# Patient Record
Sex: MALE | Race: BLACK OR AFRICAN AMERICAN | NOT HISPANIC OR LATINO
[De-identification: names, ages, dates, MRNs, and addresses within clinical notes are randomized per-mention and may not be internally consistent; named-entity substitution may affect disease eponyms.]

---

## 2018-09-17 ENCOUNTER — APPOINTMENT (OUTPATIENT)
Dept: CARDIOLOGY | Facility: CLINIC | Age: 43
End: 2018-09-17
Payer: COMMERCIAL

## 2018-09-17 VITALS
DIASTOLIC BLOOD PRESSURE: 80 MMHG | HEIGHT: 68 IN | BODY MASS INDEX: 24.25 KG/M2 | WEIGHT: 160 LBS | RESPIRATION RATE: 16 BRPM | HEART RATE: 82 BPM | SYSTOLIC BLOOD PRESSURE: 130 MMHG

## 2018-09-17 DIAGNOSIS — Z78.9 OTHER SPECIFIED HEALTH STATUS: ICD-10-CM

## 2018-09-17 PROCEDURE — 93000 ELECTROCARDIOGRAM COMPLETE: CPT | Mod: 59

## 2018-09-17 PROCEDURE — 99244 OFF/OP CNSLTJ NEW/EST MOD 40: CPT

## 2018-09-17 PROCEDURE — 93224 XTRNL ECG REC UP TO 48 HRS: CPT

## 2018-09-27 ENCOUNTER — APPOINTMENT (OUTPATIENT)
Dept: CARDIOLOGY | Facility: CLINIC | Age: 43
End: 2018-09-27
Payer: COMMERCIAL

## 2018-09-27 PROCEDURE — 93015 CV STRESS TEST SUPVJ I&R: CPT

## 2018-11-07 ENCOUNTER — APPOINTMENT (OUTPATIENT)
Dept: CARDIOLOGY | Facility: CLINIC | Age: 43
End: 2018-11-07
Payer: COMMERCIAL

## 2018-11-07 VITALS
HEIGHT: 68 IN | BODY MASS INDEX: 24.25 KG/M2 | SYSTOLIC BLOOD PRESSURE: 118 MMHG | DIASTOLIC BLOOD PRESSURE: 85 MMHG | RESPIRATION RATE: 18 BRPM | WEIGHT: 160 LBS

## 2018-11-07 PROCEDURE — 99214 OFFICE O/P EST MOD 30 MIN: CPT

## 2018-11-08 NOTE — PHYSICAL EXAM
[FreeTextEntry1] :                    Well appearing and nourished with no obvious deformities or distress.Palpation of the chest wall shows some focal tenderness but does not clearly reproduce his symptoms.\par \par Eyes: \par No conjunctival injection and no xanthelasmas.\par HEENT: \par Normocephalic.Normal oral mucosa. No pallor or cyanosis\par Neck: \par No jugular venous distension. with normal A and V wave forms. No palpable adenopathy.\par Cardiovascular: \par Normal rate and rhythm with normal S1, S2 and a grade 1/6 systolic murmur. Distal arterial pulses are normal. No significant peripheral edema.\par Pulmonary: \par Lungs are clear to auscultation and percussion. Normal respiratory pattern without any accessory muscle use\par Abdomen: \par Soft, non-tender ; no palpable organomegaly or masses.\par Extremities:\par No digital clubbing, cyanosis or ischemic changes.\par Skin: \par No skin lesions, rashes, ulcers or xanthomas.\par Psychiatric: \par Alert and oriented to person, place and time. Appropriate mood and affect.

## 2018-11-08 NOTE — REASON FOR VISIT
[FreeTextEntry1] : This is a 49-year-old male who presents for cardiac re-evaluation.  \par He has two sets of complaints:\par 1.  Chest pain that is multifocal in its location.  Some of it is mid sternal, some is in the left upper and outer quadrant of his pectoralis muscle, and some of it is left interpectoral.  The locations are focal, they are not radiating.  They usually are seconds to minutes.  Symptoms not associated with any significant activity, although certain positions such as bending to pick things up can exacerbate it. \par It was felt to be very atypical and likely musculoskeletal, but because of the pt's great concerns he was referred for EST.\par \par 2.  The patient complains of palpitations but these are usual nocturnal and described as a quick fluttering sensation.  Symptoms can occur for variable lengths of time, but are not necessarily occurring every night.  There have been no associated symptoms of dizziness, syncope, or near-syncope.  He has not had any of these symptoms in the past having only just begun in the last few weeks. \par A 24 hour Holter monitor was used to evaluate this further.\par \par No preexisting history of any cardiovascular disease.  No substance abuse.  No alcohol or tobacco use. \par \par No history of hyperlipidemia, diabetes, hypertension, or family history of premature sudden cardiac death.  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  He does not exercise regularly at this point in time.  No PND, orthopnea, syncope, or near-syncope.  \par \par \par

## 2018-11-08 NOTE — ASSESSMENT
[FreeTextEntry1] : An outside echocardiogram reportedly performed is not available to review and will be requested.\par \par Exercise stress test 9/27/18.\par Patient exercised 12 minutes to a peak heart rate of 190 at 107% maximum).\par Blood pressure response elevated to 200/86.\par Some occasional unifocal PVCs noted during exercise.\par No ischemic changes were noted.\par \par 24-hour Holter monitor 9/17/18.\par All sinus rhythm. Average heart rate 75. Range . No arrhythmias were noted.\par \par The patient presents with two sets of symptoms.  The initial chest pain symptoms seem to be somewhat atypical in nature.  He does not have any known risk factors or coronary artery disease either.  The stress test is low probability and does not show any evidence of coronary insufficiency.\par \par The patient’s symptoms of palpitations are a bit less clear.He thinks that the PVCs that he experienced during the stress test are consistent with some of the symptoms of palpitations.\par \par I recommended that he try taking magnesium 250 mg p.o. daily.\par Suggested that he discontinue all caffeine.\par Do not think there is a reason for him to discontinue exercise \par We do need to review the echocardiogram. If he has a structurally normal heart, then these benign unifocal PVCs are not likely to require further management.

## 2018-11-20 RX ORDER — FOLIC ACID 1 MG/1
1 TABLET ORAL
Refills: 0 | Status: ACTIVE | COMMUNITY

## 2019-01-07 ENCOUNTER — APPOINTMENT (OUTPATIENT)
Dept: CARDIOLOGY | Facility: CLINIC | Age: 44
End: 2019-01-07
Payer: COMMERCIAL

## 2019-01-07 VITALS
HEIGHT: 68 IN | HEART RATE: 79 BPM | DIASTOLIC BLOOD PRESSURE: 90 MMHG | SYSTOLIC BLOOD PRESSURE: 160 MMHG | WEIGHT: 158 LBS | BODY MASS INDEX: 23.95 KG/M2 | RESPIRATION RATE: 16 BRPM

## 2019-01-07 PROCEDURE — 93000 ELECTROCARDIOGRAM COMPLETE: CPT

## 2019-01-07 PROCEDURE — 99214 OFFICE O/P EST MOD 30 MIN: CPT

## 2019-01-07 NOTE — ASSESSMENT
[FreeTextEntry1] : ECG: Normal sinus rhythm at 79 beats a minute. Otherwise within normal limits.\par \par An outside echocardiogram 9/9/18 was WNL.\par \par Exercise stress test 9/27/18.\par Patient exercised 12 minutes to a peak heart rate of 190 at 107% maximum).\par Blood pressure response elevated to 200/86.\par Some occasional unifocal PVCs noted during exercise.\par No ischemic changes were noted.\par \par 24-hour Holter monitor 9/17/18.\par All sinus rhythm. Average heart rate 75. Range . No arrhythmias were noted.\par \par The patient presents with two sets of symptoms.  The initial chest pain symptoms seem to be somewhat atypical in nature.  He does not have any known risk factors or coronary artery disease either.  The stress test is low probability and does not show any evidence of coronary insufficiency.\par \par The patient’s symptoms of palpitations are a bit less clear.He thinks that the PVCs that he experienced during the stress test are consistent with some of the symptoms of palpitations.\par taking magnesium 250 mg p.o. daily. has helped substantially\par Suggested that he discontinue all caffeine.\par Do not think there is a reason for him to discontinue exercise \par \par Nocturnal awakenings may represent obstructive sleep apnea. Will order a home sleep study to further evaluate this.\par \par While he is significantly better on magnesium supplementation, he is still greatly disturbed by the palpitations when they do occur which is most often at night. Therefore am recommending that we begin Toprol-XL 25 mg p.o. q.h.s. for symptom relief.

## 2019-01-07 NOTE — REASON FOR VISIT
[FreeTextEntry1] : This is a 43-year-old male who presents for cardiac re-evaluation.  \par He has two sets of complaints:\par 1.  Chest pain that is multifocal in its location.  Some of it is mid sternal, some is in the left upper and outer quadrant of his pectoralis muscle, and some of it is left interpectoral.  The locations are focal, they are not radiating.  They usually are seconds to minutes.  Symptoms not associated with any significant activity, although certain positions such as bending to pick things up can exacerbate it. \par It was felt to be very atypical and likely musculoskeletal, but because of the pt's great concerns he was referred for EST which did not show any evidence of ischemia.\par \par 2.  The patient complains of palpitations but these are usual nocturnal and described as a quick fluttering sensation.  Symptoms can occur for variable lengths of time, but are not necessarily occurring every night.  There have been no associated symptoms of dizziness, syncope, or near-syncope.  He has not had any of these symptoms in the past having only just begun in the last few weeks. \par A 24 hour Holter monitor was used to evaluate this further and revealed no arrhythmia. PVC's were seen on the stress test.\par Magnesium has resulted in considerable symptomatic improvement.\par \par No preexisting history of any cardiovascular disease.  No substance abuse.  No alcohol or tobacco use. \par \par No history of hyperlipidemia, diabetes, hypertension, or family history of premature sudden cardiac death.  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  He does not exercise regularly at this point in time.  No PND, orthopnea, syncope, or near-syncope.  \par \par \par Also now relates that he is getting episodes of sudden nocturnal awakenings as though he is not breathing. This seems to correlate times with his palpitations.

## 2019-04-15 ENCOUNTER — NON-APPOINTMENT (OUTPATIENT)
Age: 44
End: 2019-04-15

## 2019-04-15 ENCOUNTER — APPOINTMENT (OUTPATIENT)
Dept: CARDIOLOGY | Facility: CLINIC | Age: 44
End: 2019-04-15
Payer: COMMERCIAL

## 2019-04-15 VITALS
SYSTOLIC BLOOD PRESSURE: 155 MMHG | WEIGHT: 156 LBS | BODY MASS INDEX: 23.64 KG/M2 | RESPIRATION RATE: 16 BRPM | DIASTOLIC BLOOD PRESSURE: 85 MMHG | HEART RATE: 69 BPM | HEIGHT: 68 IN

## 2019-04-15 PROCEDURE — 99214 OFFICE O/P EST MOD 30 MIN: CPT

## 2019-04-15 PROCEDURE — 93000 ELECTROCARDIOGRAM COMPLETE: CPT

## 2019-04-15 RX ORDER — METOPROLOL SUCCINATE 25 MG/1
25 TABLET, EXTENDED RELEASE ORAL DAILY
Qty: 90 | Refills: 3 | Status: DISCONTINUED | COMMUNITY
Start: 2019-01-07 | End: 2019-04-15

## 2019-04-15 RX ORDER — B-COMPLEX WITH VITAMIN C
TABLET ORAL
Refills: 0 | Status: DISCONTINUED | COMMUNITY
End: 2019-04-15

## 2019-04-15 RX ORDER — PNV NO.95/FERROUS FUM/FOLIC AC 28MG-0.8MG
TABLET ORAL
Refills: 0 | Status: DISCONTINUED | COMMUNITY
End: 2019-04-15

## 2019-04-15 RX ORDER — MAGNESIUM 200 MG
200 TABLET ORAL
Refills: 0 | Status: ACTIVE | COMMUNITY

## 2019-04-15 RX ORDER — CHOLECALCIFEROL (VITAMIN D3) 25 MCG
TABLET ORAL
Refills: 0 | Status: DISCONTINUED | COMMUNITY
End: 2019-04-15

## 2019-04-15 NOTE — ASSESSMENT
[FreeTextEntry1] : ECG: Normal sinus rhythm at 69 beats a minute. Otherwise within normal limits.\par \par An outside echocardiogram 9/9/18 was WNL.\par \par Exercise stress test 9/27/18.\par Patient exercised 12 minutes to a peak heart rate of 190 at 107% maximum).\par Blood pressure response elevated to 200/86.\par Some occasional unifocal PVCs noted during exercise.\par No ischemic changes were noted.\par \par 24-hour Holter monitor 9/17/18.\par All sinus rhythm. Average heart rate 75. Range . No arrhythmias were noted.\par \par The patient had presented with two sets of symptoms.  The initial chest pain symptoms seem to be somewhat atypical in nature.  He does not have any known risk factors or coronary artery disease either.  The stress test is low probability and does not show any evidence of coronary insufficiency.\par \par The patient’s symptoms of palpitations are a bit less clear.He thinks that the PVCs that he experienced during the stress test are consistent with some of the symptoms of palpitations.\par taking magnesium 200 mg p.o. daily. has helped substantially\par Suggested that he discontinue all caffeine.\par Do not think there is a reason for him to discontinue exercise \par BB Rx was not helpful by his report\par Nocturnal awakenings may represent obstructive sleep apnea. Will ordered a home sleep study to further evaluate this but he declined to have it\par \par While he is significantly better on magnesium supplementation, he is still greatly disturbed by the palpitations when they do occur which is most often at night. Therefore am recommending that he get an event monitor to see if there is any pathology to pursue.

## 2019-04-15 NOTE — REASON FOR VISIT
[FreeTextEntry1] : This is a 43-year-old male who presents for cardiac re-evaluation.  \par \par  The patient primarily complains of palpitations. These are usual nocturnal and described as a quick fluttering sensation.  Symptoms can occur for variable lengths of time, but are not necessarily occurring every night.  There have been no associated symptoms of dizziness, syncope, or near-syncope.  He has not had any of these symptoms in the past having only just begun in the last few weeks. \par A 24 hour Holter monitor was used to evaluate this further and revealed no arrhythmia. PVC's were seen on the stress test.\par \par Magnesium 200 mg has resulted in considerable symptomatic improvement.\par \par We tried metoprolol, however, after a few weeks he started feeling that it was not necessarily helping him it was possibly making him feel weak or tired in the morning. He was also concerned about his heart rate occasionally been in the 40s.\par No preexisting history of any cardiovascular disease.  No substance abuse.  No alcohol or tobacco use. \par \par No history of hyperlipidemia, diabetes, hypertension, or family history of premature sudden cardiac death.  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  He does not exercise regularly at this point in time.  No PND, orthopnea, syncope, or near-syncope.  \par \par \par Also now relates that he is getting episodes of sudden nocturnal awakenings as though he is not breathing. This seems to correlate times with his palpitations.

## 2019-07-22 ENCOUNTER — APPOINTMENT (OUTPATIENT)
Dept: CARDIOLOGY | Facility: CLINIC | Age: 44
End: 2019-07-22

## 2019-07-29 ENCOUNTER — APPOINTMENT (OUTPATIENT)
Dept: CARDIOLOGY | Facility: CLINIC | Age: 44
End: 2019-07-29
Payer: COMMERCIAL

## 2019-07-29 ENCOUNTER — RECORD ABSTRACTING (OUTPATIENT)
Age: 44
End: 2019-07-29

## 2019-07-29 ENCOUNTER — NON-APPOINTMENT (OUTPATIENT)
Age: 44
End: 2019-07-29

## 2019-07-29 VITALS
OXYGEN SATURATION: 98 % | DIASTOLIC BLOOD PRESSURE: 82 MMHG | SYSTOLIC BLOOD PRESSURE: 142 MMHG | BODY MASS INDEX: 22.73 KG/M2 | HEIGHT: 68 IN | WEIGHT: 150 LBS | RESPIRATION RATE: 16 BRPM | HEART RATE: 76 BPM

## 2019-07-29 DIAGNOSIS — I49.3 VENTRICULAR PREMATURE DEPOLARIZATION: ICD-10-CM

## 2019-07-29 DIAGNOSIS — R00.2 PALPITATIONS: ICD-10-CM

## 2019-07-29 DIAGNOSIS — Z00.00 ENCOUNTER FOR GENERAL ADULT MEDICAL EXAMINATION W/OUT ABNORMAL FINDINGS: ICD-10-CM

## 2019-07-29 DIAGNOSIS — R07.9 CHEST PAIN, UNSPECIFIED: ICD-10-CM

## 2019-07-29 PROCEDURE — 99214 OFFICE O/P EST MOD 30 MIN: CPT

## 2019-07-29 PROCEDURE — 93000 ELECTROCARDIOGRAM COMPLETE: CPT

## 2019-07-29 RX ORDER — CETIRIZINE HCL 10 MG
10 TABLET ORAL DAILY
Refills: 0 | Status: ACTIVE | COMMUNITY

## 2019-07-29 NOTE — REASON FOR VISIT
[FreeTextEntry1] : This is a 44-year-old male who presents for cardiac re-evaluation.  \par \par The patient continues to have  complaints of palpitations. These are usual nocturnal and described as a quick fluttering sensation.  Symptoms can occur for variable lengths of time, but are not necessarily occurring every night.  There have been no associated symptoms of dizziness, syncope, or near-syncope. \par \par We tried metoprolol, however, after a few weeks he started feeling that it was not necessarily helping him it was possibly making him feel weak or tired in the morning. He was also concerned about his heart rate occasionally been in the 40s.\par \par No preexisting history of any cardiovascular disease.  No substance abuse.  No alcohol or tobacco use. \par \par No history of hyperlipidemia, diabetes, hypertension, or family history of premature sudden cardiac death.  \par \par He does not exercise regularly at this point in time and states concerns of aggravating symptoms while physical exerting himself..  No PND, orthopnea, syncope, or near-syncope.  \par \par During our last office visit we discussed an episode  of sudden nocturnal awakenings as though he is not breathing. There has been no reoccurrence of this since.\par \par  He has since completed an event monitor study 5/4/19 which showed occ. PVCs otherwise sinus rhythm.\par \par In addition to Magnesium 200 mg daily he has started Magnesium 125 mg BID, there has been some minor improvement.\par \par He denies and SOB, edema, chest pain with rest or exertion.\par \par Rare caffeine and no ETOH

## 2019-07-29 NOTE — ASSESSMENT
[FreeTextEntry1] : ECG: Normal sinus rhythm at 76 beats a minute. Otherwise within normal limits.\par \par An outside echocardiogram 9/9/18 was WNL.\par \par Exercise stress test 9/27/18.\par Patient exercised 12 minutes to a peak heart rate of 190 at 107% maximum).\par Blood pressure response elevated to 200/86.\par Some occasional unifocal PVCs noted during exercise.\par No ischemic changes were noted.\par \par Event monitor 5/4/19\par Occ. pvcs\par Sinus rhythm\par \par 24-hour Holter monitor 9/17/18.\par All sinus rhythm. Average heart rate 75. Range . No arrhythmias were noted.\par \par  \par -The patient’s symptoms of palpitations have continued and concern him. He thinks that the PVCs that he experienced during the stress test are consistent with some of the symptoms of palpitations. There continues to be  no SOB, near syncope/syncope or  chest pain during these episodes.\par \par -Taking magnesium 200 mg p.o. daily and magnesium 125 mg BID. has helped substantially\par -Denies any caffeine, drug or ETOH use.\par -Hesitant to restart exercising.\par -Previous BB Rx was not helpful by his report\par -Nocturnal awakenings may represent obstructive sleep apnea. Will ordered a home sleep study to further evaluate this but he declined to have it\par \par While he is significantly better on magnesium supplementation, he continues to be  greatly disturbed by the palpitations. \par \par Mr. Gonzales again has been reassured that his symptoms related PVCs will not result in a life threatening condition and is more of an annoyance. He has been encouraged to resume a weekly exercise regimen but needs to initiate it with a 15-20 minute warm up period and a 15-20 min cool down period.\par \par At this time he is does not wish to start any new medications and will attempt to handle his symptoms with his current medication regimen.\par \par Clinical follow up in 6 months

## 2019-10-09 ENCOUNTER — EMERGENCY (EMERGENCY)
Facility: HOSPITAL | Age: 44
LOS: 1 days | Discharge: HOME | End: 2019-10-09
Attending: STUDENT IN AN ORGANIZED HEALTH CARE EDUCATION/TRAINING PROGRAM
Payer: COMMERCIAL

## 2019-10-09 VITALS
WEIGHT: 167.99 LBS | HEIGHT: 68 IN | HEART RATE: 93 BPM | RESPIRATION RATE: 18 BRPM | DIASTOLIC BLOOD PRESSURE: 95 MMHG | OXYGEN SATURATION: 100 % | SYSTOLIC BLOOD PRESSURE: 161 MMHG | TEMPERATURE: 98 F

## 2019-10-09 PROCEDURE — 99284 EMERGENCY DEPT VISIT MOD MDM: CPT

## 2019-10-10 VITALS
TEMPERATURE: 98 F | RESPIRATION RATE: 18 BRPM | HEART RATE: 74 BPM | OXYGEN SATURATION: 100 % | DIASTOLIC BLOOD PRESSURE: 93 MMHG | SYSTOLIC BLOOD PRESSURE: 146 MMHG

## 2019-10-10 LAB
ALBUMIN SERPL ELPH-MCNC: 4.4 G/DL — SIGNIFICANT CHANGE UP (ref 3.3–5.2)
ALP SERPL-CCNC: 55 U/L — SIGNIFICANT CHANGE UP (ref 40–120)
ALT FLD-CCNC: 13 U/L — SIGNIFICANT CHANGE UP
ANION GAP SERPL CALC-SCNC: 11 MMOL/L — SIGNIFICANT CHANGE UP (ref 5–17)
AST SERPL-CCNC: 20 U/L — SIGNIFICANT CHANGE UP
BILIRUB SERPL-MCNC: 0.2 MG/DL — LOW (ref 0.4–2)
BUN SERPL-MCNC: 16 MG/DL — SIGNIFICANT CHANGE UP (ref 8–20)
CALCIUM SERPL-MCNC: 9.6 MG/DL — SIGNIFICANT CHANGE UP (ref 8.6–10.2)
CHLORIDE SERPL-SCNC: 101 MMOL/L — SIGNIFICANT CHANGE UP (ref 98–107)
CO2 SERPL-SCNC: 26 MMOL/L — SIGNIFICANT CHANGE UP (ref 22–29)
CREAT SERPL-MCNC: 1.5 MG/DL — HIGH (ref 0.5–1.3)
GLUCOSE SERPL-MCNC: 93 MG/DL — SIGNIFICANT CHANGE UP (ref 70–115)
HCT VFR BLD CALC: 41.1 % — SIGNIFICANT CHANGE UP (ref 39–50)
HGB BLD-MCNC: 13 G/DL — SIGNIFICANT CHANGE UP (ref 13–17)
MAGNESIUM SERPL-MCNC: 2 MG/DL — SIGNIFICANT CHANGE UP (ref 1.6–2.6)
MCHC RBC-ENTMCNC: 22.6 PG — LOW (ref 27–34)
MCHC RBC-ENTMCNC: 31.6 GM/DL — LOW (ref 32–36)
MCV RBC AUTO: 71.6 FL — LOW (ref 80–100)
PLATELET # BLD AUTO: 217 K/UL — SIGNIFICANT CHANGE UP (ref 150–400)
POTASSIUM SERPL-MCNC: 4.5 MMOL/L — SIGNIFICANT CHANGE UP (ref 3.5–5.3)
POTASSIUM SERPL-SCNC: 4.5 MMOL/L — SIGNIFICANT CHANGE UP (ref 3.5–5.3)
PROT SERPL-MCNC: 7.5 G/DL — SIGNIFICANT CHANGE UP (ref 6.6–8.7)
RBC # BLD: 5.74 M/UL — SIGNIFICANT CHANGE UP (ref 4.2–5.8)
RBC # FLD: 14.3 % — SIGNIFICANT CHANGE UP (ref 10.3–14.5)
SODIUM SERPL-SCNC: 138 MMOL/L — SIGNIFICANT CHANGE UP (ref 135–145)
T4 AB SER-ACNC: 5.2 UG/DL — SIGNIFICANT CHANGE UP (ref 4.5–12)
TROPONIN T SERPL-MCNC: <0.01 NG/ML — SIGNIFICANT CHANGE UP (ref 0–0.06)
TSH SERPL-MCNC: 1.23 UIU/ML — SIGNIFICANT CHANGE UP (ref 0.27–4.2)
WBC # BLD: 4.22 K/UL — SIGNIFICANT CHANGE UP (ref 3.8–10.5)
WBC # FLD AUTO: 4.22 K/UL — SIGNIFICANT CHANGE UP (ref 3.8–10.5)

## 2019-10-10 PROCEDURE — 80053 COMPREHEN METABOLIC PANEL: CPT

## 2019-10-10 PROCEDURE — 93005 ELECTROCARDIOGRAM TRACING: CPT

## 2019-10-10 PROCEDURE — 70450 CT HEAD/BRAIN W/O DYE: CPT

## 2019-10-10 PROCEDURE — 84484 ASSAY OF TROPONIN QUANT: CPT

## 2019-10-10 PROCEDURE — 93010 ELECTROCARDIOGRAM REPORT: CPT

## 2019-10-10 PROCEDURE — 36415 COLL VENOUS BLD VENIPUNCTURE: CPT

## 2019-10-10 PROCEDURE — 83735 ASSAY OF MAGNESIUM: CPT

## 2019-10-10 PROCEDURE — 70450 CT HEAD/BRAIN W/O DYE: CPT | Mod: 26

## 2019-10-10 PROCEDURE — 84443 ASSAY THYROID STIM HORMONE: CPT

## 2019-10-10 PROCEDURE — 99284 EMERGENCY DEPT VISIT MOD MDM: CPT

## 2019-10-10 PROCEDURE — 84436 ASSAY OF TOTAL THYROXINE: CPT

## 2019-10-10 PROCEDURE — 85027 COMPLETE CBC AUTOMATED: CPT

## 2019-10-10 NOTE — ED ADULT NURSE NOTE - OBJECTIVE STATEMENT
Pt Pt c/o of shaking when sleeping at night witnessed by spouse. Pt spouse states he shakes when he sleeps but it usually subsides after a minute. Lasst night pt was awake and began to shake which woke him up and he continued to shake even after being up. Pt spouse drove pt to ED. Pt has PMHx of PVC's, HTN. Pt on assessment has no shaking going on and no motor deficits.

## 2019-10-10 NOTE — ED PROVIDER NOTE - CHPI ED SYMPTOMS NEG
no chest pain/no cough/no back pain/no dizziness/no fever/no diaphoresis/no chills/no nausea/no syncope/no shortness of breath/no vomiting

## 2019-10-10 NOTE — ED PROVIDER NOTE - ATTENDING CONTRIBUTION TO CARE
43yo male with pmh of PVC (no meds) presents with sudden onset of palpitations, tremors earlier this evening, SO states woke up to him shaking - no seizure like activity. Pt woke up no post ictal period, took /100 and became concerned came to ED. Pt denies fevers/chills, ha, loc, focal neuro deficits, cp/sob, cough, abd pain/n/v/d, urinary symptoms, recent travel and sick contacts.  Const: Awake, alert and oriented. In no acute distress. Well appearing.  HEENT: NC/AT. Moist mucous membranes.  Eyes: No scleral icterus. EOMI.  Neck:. Soft and supple. Full ROM without pain.  Cardiac: Regular rate and regular rhythm. +S1/S2. Peripheral pulses 2+ and symmetric. No LE edema.  Resp: Speaking in full sentences. No evidence of respiratory distress. No wheezes, rales or rhonchi.  Abd: Soft, non-tender, non-distended. Normal bowel sounds in all 4 quadrants. No guarding or rebound.  Back: Spine midline and non-tender. No CVAT.  Skin: No rashes, abrasions or lacerations.  Lymph: No cervical lymphadenopathy.  Neuro: Awake, alert & oriented x 3. Moves all extremities symmetrically. follows commands, motor sumaya upper and lower ext 5/5, sensory symm and intact CN 2-12 grossly intact, no ataxia, no nystagmus, no dysmetria, no ddk, symm sumaya, no pronator drift  r/o end organ damage - labs, ekg, trop I personally saw the patient with the PA, and completed the key components of the history and physical exam. I then discussed the management plan with the PA.    45yo male with pmh of PVC (no meds) presents with sudden onset of palpitations, tremors earlier this evening, SO states woke up to him shaking - no seizure like activity. Pt woke up no post ictal period, took /100 and became concerned came to ED. Pt denies fevers/chills, ha, loc, focal neuro deficits, cp/sob, cough, abd pain/n/v/d, urinary symptoms, recent travel and sick contacts.  Const: Awake, alert and oriented. In no acute distress. Well appearing.  HEENT: NC/AT. Moist mucous membranes.  Eyes: No scleral icterus. EOMI.  Neck:. Soft and supple. Full ROM without pain.  Cardiac: Regular rate and regular rhythm. +S1/S2. Peripheral pulses 2+ and symmetric. No LE edema.  Resp: Speaking in full sentences. No evidence of respiratory distress. No wheezes, rales or rhonchi.  Abd: Soft, non-tender, non-distended. Normal bowel sounds in all 4 quadrants. No guarding or rebound.  Back: Spine midline and non-tender. No CVAT.  Skin: No rashes, abrasions or lacerations.  Lymph: No cervical lymphadenopathy.  Neuro: Awake, alert & oriented x 3. Moves all extremities symmetrically. follows commands, motor sumaya upper and lower ext 5/5, sensory symm and intact CN 2-12 grossly intact, no ataxia, no nystagmus, no dysmetria, no ddk, symm sumaya, no pronator drift  r/o end organ damage - labs, ekg, trop

## 2019-10-10 NOTE — ED ADULT NURSE REASSESSMENT NOTE - NS ED NURSE REASSESS COMMENT FT1
pt hemodynamically stable, PIV removed, refer to flowsheet and chart, pt to be discharged, pt understood discharge instructions and plan of care. Pt seen and medically cleared by WINSTON Soares.

## 2019-10-10 NOTE — ED PROVIDER NOTE - CLINICAL SUMMARY MEDICAL DECISION MAKING FREE TEXT BOX
43 y/o male with hx of PVCs currently being followed by Cardio, Dr. Mccormick presents to the ED c/o episode of shaking that awoke him out of sleep earlier this evening and HTN. labs, ekg, ct head and reassess

## 2019-10-10 NOTE — ED PROVIDER NOTE - NS ED ROS FT
General-alert and oriented to person place and time, nontoxic appearing, pleasant cooperative, NAD  HEENT-normocephalic, atraumatic, NT to palp, EOMI, PERRLA, no conjunctival injections, nares patent, pinna nt to palp, tympanic membrane intact bilaterally, nonbulging TM, no erythema noted, +light reflex, moist oral mucosa, tongue nonenlarged, uvula midline, tonsils nonenlarged, no exudates or erythema noted  Neck- supple, trach midline, No JVD, no LAD  Chest- Nt to palp, no reproducible pain  Cardio-s1,s2 present, regular rate and rhythm  Resp- talks in full sentences, symmetrical chest rise, CTA bilat, no evidence of wheezes, rhonchi noted  Abdomen- bowel sounds present in all 4 quadrants, soft, NT/ND, no guarding, no rebound tenderness  MSK- moves all extremities, able to ambulate without issues  Back- nt to palp of cervical, thoracic, lumbar spine, nt to palp of paraspinal m., No CVA tenderness  Neuro- no focal deficits, sensation intact, CN II-XII intact, neg pronator drift, 2+ reflexes,

## 2019-10-10 NOTE — ED PROVIDER NOTE - PATIENT PORTAL LINK FT
You can access the FollowMyHealth Patient Portal offered by [facility] by registering at the following website: [email or web address]. By joining [employer]’s FollowMyHealth portal, you will also be able to view your health information using other applications (apps) compatible with our system.

## 2019-10-10 NOTE — ED ADULT NURSE NOTE - NSIMPLEMENTINTERV_GEN_ALL_ED
Implemented All Universal Safety Interventions:  [place] to call system. Call bell, personal items and telephone within reach. Instruct patient to call for assistance. Room bathroom lighting operational. Non-slip footwear when patient is off stretcher. Physically safe environment: no spills, clutter or unnecessary equipment. Stretcher in lowest position, wheels locked, appropriate side rails in place.

## 2019-10-10 NOTE — ED PROVIDER NOTE - OBJECTIVE STATEMENT
45 y/o male with hx of PVCs currently being followed by Cardio, Dr. Mccormick presents to the ED c/o episode of shaking that awoke him out of sleep earlier this evening and HTN. Pt notes he was been following his PCP who advised him to keep a BP log but never diagnosed with HTN, not on BP meds. Normally runs at 130s/80s as per patient. Significant other at bedside notes she woke up to pt shaking in his sleep. Notes episodes seemed as if he was cold, did not appear to be seizure-like activity as per signficant other. Pt was not postical when he woke. Notes he has had episodes of shaking in the past. States he felt restless and anxious and he took his BP which was in the 180s/110s. Also felt heart beating out of his chest and some jaw pain. Pt admits to working long hours and stress. Notes never has been diagnosed with anxiety. Denies chest pain, SOB, nausea, vomiting, fevers, chills, cough, left arm pain, no family history of early MI, no smoking, no recent travels, no hx of clots, no recent travels, no calf pain.

## 2019-10-10 NOTE — ED PROVIDER NOTE - PROGRESS NOTE DETAILS
PA NOTE: Bp has improved, advised of results of ct scan, advised to f/u with neuro, pt to follow up with cardiologist, Pt reassessed, pt feeling better at this time, vss, pt able to walk, talk and vocalized plan of action. Discussed in depth the results and findings that were performed in the ED and explained to pt in depth the next steps that need to be taking including any medications and proper follow up with PCP or specialists. Pt verbalized their concerns and all questions were answered. Pt understands dispo and agrees with discharge.

## 2019-10-14 ENCOUNTER — APPOINTMENT (OUTPATIENT)
Dept: NEUROLOGY | Facility: CLINIC | Age: 44
End: 2019-10-14

## 2019-10-21 ENCOUNTER — APPOINTMENT (OUTPATIENT)
Dept: NEUROLOGY | Facility: CLINIC | Age: 44
End: 2019-10-21
Payer: COMMERCIAL

## 2019-10-21 VITALS
WEIGHT: 150 LBS | DIASTOLIC BLOOD PRESSURE: 86 MMHG | BODY MASS INDEX: 22.73 KG/M2 | HEIGHT: 68 IN | SYSTOLIC BLOOD PRESSURE: 120 MMHG

## 2019-10-21 DIAGNOSIS — R56.9 UNSPECIFIED CONVULSIONS: ICD-10-CM

## 2019-10-21 PROCEDURE — 99204 OFFICE O/P NEW MOD 45 MIN: CPT

## 2019-10-21 NOTE — HISTORY OF PRESENT ILLNESS
[FreeTextEntry1] : Initial office visit October 21, 2019:\par This is a 44-year-old man who presents today for neurologic evaluation of an episode of shaking. He states he woke with shaking in his jaw chattering. He states his wife woke him up. This persisted after he woke up in the jaw chattering persisted during the day. His blood pressure was 180/100 at the time. Into the emergency room. A CAT scan which will be discussed below. He is concerned about why he had a shaking episode. He recently developed palpitations and PVCs. As a question of some anxiety at times. He states his blood pressure is higher at night after he returns from work. He is here today for neurologic evaluation.

## 2019-10-21 NOTE — CONSULT LETTER
[Dear  ___] : Dear  [unfilled], [Consult Letter:] : I had the pleasure of evaluating your patient, [unfilled]. [Please see my note below.] : Please see my note below. [Consult Closing:] : Thank you very much for allowing me to participate in the care of this patient.  If you have any questions, please do not hesitate to contact me. [Sincerely,] : Sincerely, [FreeTextEntry3] : Raad Moser M.D., Ph.D. DPN-N\par [facility]\par Neurology at [place]\par Medical Director of Stroke Services\par [facility]\par

## 2019-10-21 NOTE — DATA REVIEWED
[de-identified] : CAT scan of his head was done on October 10, 2019. It did not show acute stroke mass or bleed. There was an enlarged perivascular space of the right subinsular region.

## 2019-10-21 NOTE — ASSESSMENT
[FreeTextEntry1] : This is a 44-year-old man who presents today for evaluation of shaking. It happened during sleep but he woke and was still shaking. Other doubtful this may represent a nocturnal seizure. I will check an EEG. Regarding his CAT scan it appears he has an enlarged perivascular space. I think it is a lacunar infarct. He should continue to monitor his blood pressure per cardiology and he was normal. His blood pressure rise he may benefit from a baby aspirin as well at his treatment for hypertension. I will call him with the results of the EEG and see him back if needed.

## 2019-10-21 NOTE — PHYSICAL EXAM
[General Appearance - Alert] : alert [General Appearance - In No Acute Distress] : in no acute distress [General Appearance - Well Developed] : well developed [General Appearance - Well Nourished] : well nourished [Person] : oriented to person [Place] : oriented to place [Short Term Intact] : short term memory intact [Time] : oriented to time [Remote Intact] : remote memory intact [Registration Intact] : recent registration memory intact [Concentration Intact] : normal concentrating ability [Span Intact] : the attention span was normal [Repeating Phrases] : no difficulty repeating a phrase [Naming Objects] : no difficulty naming common objects [Visual Intact] : visual attention was ~T not ~L decreased [Fluency] : fluency intact [Comprehension] : comprehension intact [Current Events] : adequate knowledge of current events [Past History] : adequate knowledge of personal past history [Cranial Nerves Optic (II)] : visual acuity intact bilaterally,  visual fields full to confrontation, pupils equal round and reactive to light [Cranial Nerves Trigeminal (V)] : facial sensation intact symmetrically [Cranial Nerves Oculomotor (III)] : extraocular motion intact [Cranial Nerves Facial (VII)] : face symmetrical [Cranial Nerves Vestibulocochlear (VIII)] : hearing was intact bilaterally [Cranial Nerves Hypoglossal (XII)] : there was no tongue deviation with protrusion [Cranial Nerves Glossopharyngeal (IX)] : tongue and palate midline [Cranial Nerves Accessory (XI - Cranial And Spinal)] : head turning and shoulder shrug symmetric [Motor Strength] : muscle strength was normal in all four extremities [Paresis Pronator Drift Right-Sided] : no pronator drift on the right [No Muscle Atrophy] : normal bulk in all four extremities [Paresis Pronator Drift Left-Sided] : no pronator drift on the left [Sensation Vibration Decrease] : vibration was intact [Sensation Pain / Temperature Decrease] : pain and temperature was intact [Sensation Tactile Decrease] : light touch was intact [Proprioception] : proprioception was intact [Balance] : balance was intact [Coordination - Dysmetria Impaired Finger-to-Nose Bilateral] : not present [Tremor] : no tremor present [2+] : Patella left 2+ [Sclera] : the sclera and conjunctiva were normal [Extraocular Movements] : extraocular movements were intact [PERRL With Normal Accommodation] : pupils were equal in size, round, reactive to light, with normal accommodation [Optic Disc Abnormality] : the optic disc were normal in size and color [No APD] : no afferent pupillary defect [Full Visual Field] : full visual field [No JOÃO] : no internuclear ophthalmoplegia [Papilledema Of Both Eyes] : no papilledema [Disc Blurred Margins Both Eyes] : sharp margins [Abnormal Walk] : normal gait [Edema] : there was no peripheral edema [Motor Tone] : muscle strength and tone were normal [Involuntary Movements] : no involuntary movements were seen

## 2019-10-28 ENCOUNTER — APPOINTMENT (OUTPATIENT)
Dept: NEUROLOGY | Facility: CLINIC | Age: 44
End: 2019-10-28
Payer: COMMERCIAL

## 2019-10-28 PROCEDURE — 95819 EEG AWAKE AND ASLEEP: CPT

## 2019-10-28 PROCEDURE — 93040 RHYTHM ECG WITH REPORT: CPT

## 2020-09-24 NOTE — ED PROVIDER NOTE - NS ED ATTENDING STATEMENT MOD
Physical Therapy I have personally performed a face to face diagnostic evaluation on this patient. I have reviewed the ACP note and agree with the history, exam and plan of care, except as noted.

## 2021-12-01 NOTE — ED ADULT NURSE NOTE - CAS DISCH ACCOMP BY
Called and spoke to patient, informed her that unfortunately there are no available appointments with Dr. Gamboa for dates she is trying to have her clearance by.  Informed patient that partner physician Dr. Renee has an available time slot on 12/17/2021 at 1:00 PM.  Patient stated understanding, and agreeable to be seen by partner physician.  Appt then scheduled, no further questions or concerns for me at this time.   Spouse

## 2023-03-14 ENCOUNTER — OFFICE (OUTPATIENT)
Dept: URBAN - METROPOLITAN AREA CLINIC 6 | Facility: CLINIC | Age: 48
Setting detail: OPHTHALMOLOGY
End: 2023-03-14
Payer: COMMERCIAL

## 2023-03-14 DIAGNOSIS — H25.13: ICD-10-CM

## 2023-03-14 DIAGNOSIS — H11.153: ICD-10-CM

## 2023-03-14 DIAGNOSIS — H40.013: ICD-10-CM

## 2023-03-14 PROCEDURE — 92083 EXTENDED VISUAL FIELD XM: CPT | Performed by: OPHTHALMOLOGY

## 2023-03-14 PROCEDURE — 92133 CPTRZD OPH DX IMG PST SGM ON: CPT | Performed by: OPHTHALMOLOGY

## 2023-03-14 PROCEDURE — 99213 OFFICE O/P EST LOW 20 MIN: CPT | Performed by: OPHTHALMOLOGY

## 2023-03-14 ASSESSMENT — REFRACTION_AUTOREFRACTION
OS_CYLINDER: -0.25
OD_CYLINDER: SPH
OD_SPHERE: PLANO
OD_AXIS: 0
OS_SPHERE: +0.25
OS_AXIS: 058

## 2023-03-14 ASSESSMENT — KERATOMETRY
OD_K2POWER_DIOPTERS: 44.00
OS_AXISANGLE_DEGREES: 086
METHOD_AUTO_MANUAL: AUTO
OS_K2POWER_DIOPTERS: 44.25
OD_AXISANGLE_DEGREES: 096
OS_K1POWER_DIOPTERS: 43.50
OD_K1POWER_DIOPTERS: 43.00

## 2023-03-14 ASSESSMENT — TONOMETRY
OD_IOP_MMHG: 18
OS_IOP_MMHG: 17

## 2023-03-14 ASSESSMENT — VISUAL ACUITY
OD_BCVA: 20/20-1
OS_BCVA: 20/20

## 2023-03-14 ASSESSMENT — SPHEQUIV_DERIVED: OS_SPHEQUIV: 0.125

## 2023-03-14 ASSESSMENT — AXIALLENGTH_DERIVED: OS_AL: 23.4069

## 2023-03-14 ASSESSMENT — CONFRONTATIONAL VISUAL FIELD TEST (CVF)
OD_FINDINGS: FULL
OS_FINDINGS: FULL

## 2023-09-26 ENCOUNTER — OFFICE (OUTPATIENT)
Dept: URBAN - METROPOLITAN AREA CLINIC 6 | Facility: CLINIC | Age: 48
Setting detail: OPHTHALMOLOGY
End: 2023-09-26
Payer: COMMERCIAL

## 2023-09-26 DIAGNOSIS — H11.153: ICD-10-CM

## 2023-09-26 DIAGNOSIS — H25.13: ICD-10-CM

## 2023-09-26 DIAGNOSIS — H40.013: ICD-10-CM

## 2023-09-26 PROCEDURE — 92250 FUNDUS PHOTOGRAPHY W/I&R: CPT | Performed by: OPHTHALMOLOGY

## 2023-09-26 PROCEDURE — 92014 COMPRE OPH EXAM EST PT 1/>: CPT | Performed by: OPHTHALMOLOGY

## 2023-09-26 ASSESSMENT — KERATOMETRY
OD_K2POWER_DIOPTERS: 43.50
OD_K1POWER_DIOPTERS: 43.25
METHOD_AUTO_MANUAL: AUTO
OD_AXISANGLE_DEGREES: 078
OS_K2POWER_DIOPTERS: 44.25
OS_AXISANGLE_DEGREES: 092
OS_K1POWER_DIOPTERS: 43.50

## 2023-09-26 ASSESSMENT — TONOMETRY
OD_IOP_MMHG: 15
OS_IOP_MMHG: 16

## 2023-09-26 ASSESSMENT — REFRACTION_AUTOREFRACTION
OS_SPHERE: +0.25
OS_CYLINDER: -0.25
OD_AXIS: 102
OS_AXIS: 067
OD_CYLINDER: -0.50
OD_SPHERE: +0.25

## 2023-09-26 ASSESSMENT — VISUAL ACUITY
OS_BCVA: 20/20
OD_BCVA: 20/20

## 2023-09-26 ASSESSMENT — SPHEQUIV_DERIVED
OD_SPHEQUIV: 0
OS_SPHEQUIV: 0.125

## 2023-09-26 ASSESSMENT — AXIALLENGTH_DERIVED
OD_AL: 23.6379
OS_AL: 23.4069

## 2024-04-08 NOTE — PHYSICAL EXAM
[FreeTextEntry1] :                    Well appearing and nourished with no obvious deformities or distress.Palpation of the chest wall shows some focal tenderness but does not clearly reproduce his symptoms.\par \par Eyes: \par No conjunctival injection and no xanthelasmas.\par HEENT: \par Normocephalic.Normal oral mucosa. No pallor or cyanosis\par Neck: \par No jugular venous distension. with normal A and V wave forms. No palpable adenopathy.\par Cardiovascular: \par Normal rate and rhythm with normal S1, S2 and a grade 1/6 systolic murmur. Distal arterial pulses are normal. No significant peripheral edema.\par Pulmonary: \par Lungs are clear to auscultation and percussion. Normal respiratory pattern without any accessory muscle use\par Abdomen: \par Soft, non-tender ; no palpable organomegaly or masses.\par Extremities:\par No digital clubbing, cyanosis or ischemic changes.\par Skin: \par No skin lesions, rashes, ulcers or xanthomas.\par Psychiatric: \par Alert and oriented to person, place and time. Appropriate mood and affect. runny nose/ear pain